# Patient Record
Sex: MALE | URBAN - METROPOLITAN AREA
[De-identification: names, ages, dates, MRNs, and addresses within clinical notes are randomized per-mention and may not be internally consistent; named-entity substitution may affect disease eponyms.]

---

## 2019-02-12 ENCOUNTER — OFFICE VISIT (OUTPATIENT)
Dept: URBAN - METROPOLITAN AREA CLINIC 23 | Facility: CLINIC | Age: 79
End: 2019-02-12
Payer: MEDICARE

## 2019-02-12 DIAGNOSIS — H26.491 OTHER SECONDARY CATARACT, RIGHT EYE: Primary | ICD-10-CM

## 2019-02-12 PROCEDURE — 92014 COMPRE OPH EXAM EST PT 1/>: CPT | Performed by: OPTOMETRIST

## 2019-02-12 ASSESSMENT — INTRAOCULAR PRESSURE
OD: 10
OS: 12

## 2019-02-12 ASSESSMENT — VISUAL ACUITY
OD: 20/60
OS: 20/25

## 2019-02-12 ASSESSMENT — KERATOMETRY
OD: 45.88
OS: 46.50

## 2019-02-12 NOTE — IMPRESSION/PLAN
Impression: Other secondary cataract, right eye: H26.491. Plan: Discussed findings. Recommend YAG. Refraction after YAG PRN.

## 2019-03-20 ENCOUNTER — SURGERY (OUTPATIENT)
Dept: URBAN - METROPOLITAN AREA SURGERY 11 | Facility: SURGERY | Age: 79
End: 2019-03-20
Payer: MEDICARE

## 2019-03-20 PROCEDURE — 66821 AFTER CATARACT LASER SURGERY: CPT | Performed by: OPHTHALMOLOGY

## 2019-03-27 ENCOUNTER — POST-OPERATIVE VISIT (OUTPATIENT)
Dept: URBAN - METROPOLITAN AREA CLINIC 23 | Facility: CLINIC | Age: 79
End: 2019-03-27

## 2019-03-27 DIAGNOSIS — Z09 ENCNTR FOR F/U EXAM AFT TRTMT FOR COND OTH THAN MALIG NEOPLM: Primary | ICD-10-CM

## 2019-03-27 PROCEDURE — 99024 POSTOP FOLLOW-UP VISIT: CPT | Performed by: OPTOMETRIST

## 2019-03-27 ASSESSMENT — INTRAOCULAR PRESSURE
OD: 14
OS: 14

## 2020-09-23 ENCOUNTER — OFFICE VISIT (OUTPATIENT)
Dept: URBAN - METROPOLITAN AREA CLINIC 23 | Facility: CLINIC | Age: 80
End: 2020-09-23
Payer: MEDICARE

## 2020-09-23 DIAGNOSIS — H43.812 VITREOUS DEGENERATION, LEFT EYE: ICD-10-CM

## 2020-09-23 PROCEDURE — 92014 COMPRE OPH EXAM EST PT 1/>: CPT | Performed by: OPTOMETRIST

## 2020-09-23 ASSESSMENT — INTRAOCULAR PRESSURE
OD: 12
OS: 12

## 2020-09-23 ASSESSMENT — KERATOMETRY
OS: 46.75
OD: 46.00

## 2020-09-23 NOTE — IMPRESSION/PLAN
Impression: Vitreous degeneration, left eye: H43.812. Plan: Posterior vitreous detachment accounts for the patient's complaints. There is no evidence of retinal pathology. All signs and risks of retinal detachment and tears were discussed in detail. Patient instructed to call the office immediately if any symptoms noted. Return in one year for a dilated exam with Dr. Ileana Ortiz.

## 2020-09-23 NOTE — IMPRESSION/PLAN
Impression: Dry eye syndrome of bilateral lacrimal glands: H04.123. Plan: Dry eyes account for the patient's complaints. There is no evidence of permanent changes to the cornea. Explained condition does not have a cure and will need artificial tears for maintenance. Recommended GenTeal Gel QHS OU.

## 2021-10-15 ENCOUNTER — OFFICE VISIT (OUTPATIENT)
Dept: URBAN - METROPOLITAN AREA CLINIC 23 | Facility: CLINIC | Age: 81
End: 2021-10-15
Payer: MEDICARE

## 2021-10-15 DIAGNOSIS — H04.123 DRY EYE SYNDROME OF BILATERAL LACRIMAL GLANDS: Primary | ICD-10-CM

## 2021-10-15 PROCEDURE — 99213 OFFICE O/P EST LOW 20 MIN: CPT | Performed by: OPTOMETRIST

## 2021-10-15 ASSESSMENT — KERATOMETRY
OD: 46.50
OS: 46.38

## 2021-10-15 ASSESSMENT — INTRAOCULAR PRESSURE
OS: 19
OD: 22

## 2021-10-15 NOTE — IMPRESSION/PLAN
Impression: Dry eye syndrome of bilateral lacrimal glands: H04.123 Bilateral. Condition: stable. Plan: Examination findings were discussed. Dry eyes can be a chronic problem. Artificial tears are recommended throughout the day. Continue using GenTeal gel at bedtime. Monitor.

## 2022-10-18 ENCOUNTER — OFFICE VISIT (OUTPATIENT)
Dept: URBAN - METROPOLITAN AREA CLINIC 23 | Facility: CLINIC | Age: 82
End: 2022-10-18
Payer: MEDICARE

## 2022-10-18 DIAGNOSIS — H35.373 PUCKERING OF MACULA, BILATERAL: Primary | ICD-10-CM

## 2022-10-18 DIAGNOSIS — H04.123 DRY EYE SYNDROME OF BILATERAL LACRIMAL GLANDS: ICD-10-CM

## 2022-10-18 PROCEDURE — 92134 CPTRZ OPH DX IMG PST SGM RTA: CPT | Performed by: OPTOMETRIST

## 2022-10-18 PROCEDURE — 99213 OFFICE O/P EST LOW 20 MIN: CPT | Performed by: OPTOMETRIST

## 2022-10-18 ASSESSMENT — INTRAOCULAR PRESSURE
OD: 17
OS: 17

## 2022-10-18 NOTE — IMPRESSION/PLAN
Impression: Dry eye syndrome of bilateral lacrimal glands: H04.123. Bilateral. Condition: well controlled. Plan: Continue use of artificial tears throughout the day. Gel at bedtime may also be helpful.

## 2022-10-18 NOTE — IMPRESSION/PLAN
Impression: Puckering of macula, bilateral: H35.373. Bilateral. Condition: established, stable. Plan: Discussed findings, MAC OCT ordered and reviewed. Stable ERM, no progression noted. Recommend monitoring yearly, sooner with any vision changes.

## 2024-10-17 ENCOUNTER — OFFICE VISIT (OUTPATIENT)
Dept: URBAN - METROPOLITAN AREA CLINIC 23 | Facility: CLINIC | Age: 84
End: 2024-10-17
Payer: MEDICARE

## 2024-10-17 DIAGNOSIS — H35.373 PUCKERING OF MACULA, BILATERAL: ICD-10-CM

## 2024-10-17 DIAGNOSIS — Z96.1 PRESENCE OF INTRAOCULAR LENS: ICD-10-CM

## 2024-10-17 DIAGNOSIS — H04.123 DRY EYE SYNDROME OF BILATERAL LACRIMAL GLANDS: Primary | ICD-10-CM

## 2024-10-17 DIAGNOSIS — H43.812 VITREOUS DEGENERATION, LEFT EYE: ICD-10-CM

## 2024-10-17 PROCEDURE — 99214 OFFICE O/P EST MOD 30 MIN: CPT

## 2024-10-17 PROCEDURE — 92134 CPTRZ OPH DX IMG PST SGM RTA: CPT

## 2024-10-17 ASSESSMENT — INTRAOCULAR PRESSURE
OD: 12
OS: 14

## 2024-10-17 ASSESSMENT — VISUAL ACUITY
OS: 20/20
OD: 20/25

## 2024-10-17 ASSESSMENT — KERATOMETRY
OD: 45.88
OS: 46.25